# Patient Record
Sex: MALE | Race: WHITE
[De-identification: names, ages, dates, MRNs, and addresses within clinical notes are randomized per-mention and may not be internally consistent; named-entity substitution may affect disease eponyms.]

---

## 2021-04-16 ENCOUNTER — HOSPITAL ENCOUNTER (OUTPATIENT)
Dept: HOSPITAL 47 - RADMRIMAIN | Age: 51
Discharge: HOME | End: 2021-04-16
Attending: ORTHOPAEDIC SURGERY
Payer: COMMERCIAL

## 2021-04-16 DIAGNOSIS — M17.11: ICD-10-CM

## 2021-04-16 DIAGNOSIS — M23.321: Primary | ICD-10-CM

## 2021-04-18 NOTE — MR
EXAMINATION TYPE: MR knee RT wo con

 

DATE OF EXAM: 4/16/2021

 

COMPARISON: Radiograph 4/9/2021

 

HISTORY: 50-year-old male Right knee pain

 

TECHNIQUE: Multiplanar, multisequence imaging of the right knee is performed without IV contrast.

 

FINDINGS:

ACL and PCL are intact. There is mild thickening of the intact MCL fibers suggesting sequela of old i
njury. LCL complex is intact.

 

There is an oblique tear involving the junction of the posterior horn and body of the medial meniscus
. In addition, cystic change is present at the tibial attachment site of the anterior horn of the med
ial meniscus. This could represent a subtle partial tear at the anterior root. Moderate irregular car
tilage loss along the mid weightbearing aspect of the medial femoral condyle in the medial compartmen
t. Additional mild irregular cartilage loss along the medial central aspect of the medial femoral con
dyle.

 

Minimal inner margin fraying of the body of the lateral meniscus. Overall lateral compartment articul
ar cartilage appears maintained.

 

Patellofemoral compartment shows moderate irregular thinning of articular cartilage along the medial 
patellar facet and some reactive underlying subchondral signal changes especially along the inferior 
aspect of the medial patellar facet. Mild to moderate cartilage measuring along the medial trochlear 
facet but with overall preserved cartilage volume.

 

Extensor mechanism is intact with nonspecific anterior soft tissues. There is some bony irregularity 
at the tibial tuberosity probably reflecting old sprain or injury at the tendon insertion.

 

Moderate to large joint effusion. There is a 1.6 x 1.4 cm multilocular ganglion cyst in the lateral g
astrocnemius origin.

 

Normal popliteal artery anatomy and muscle bulk. Mild change within the lateral gastrocnemius. No sharon
picious bone marrow replacement.

 

 

IMPRESSION: 

 

1. Oblique tear at the junction of the posterior horn and body of the medial meniscus. Some intraosse
ous cystic change at the anterior root insertion of the medial meniscus could reflect a subtle small 
partial anterior root tear.

 

2. Mild overall medial compartmental OA.

 

3. Moderate OA along the medial facets of the patellofemoral compartment.

 

4. Moderate to large knee joint effusion. Some edema within the lateral gastrocnemius could be reacti
ve to altered biomechanics or could reflect a mild muscle strain.

## 2021-06-09 ENCOUNTER — HOSPITAL ENCOUNTER (OUTPATIENT)
Dept: HOSPITAL 47 - LABWHC1 | Age: 51
Discharge: HOME | End: 2021-06-09
Attending: ORTHOPAEDIC SURGERY
Payer: COMMERCIAL

## 2021-06-09 DIAGNOSIS — M23.91: ICD-10-CM

## 2021-06-09 DIAGNOSIS — Z01.812: Primary | ICD-10-CM

## 2021-06-09 LAB
ANION GAP SERPL CALC-SCNC: 9 MMOL/L
BASOPHILS # BLD AUTO: 0 K/UL (ref 0–0.2)
BASOPHILS NFR BLD AUTO: 1 %
CHLORIDE SERPL-SCNC: 106 MMOL/L (ref 98–107)
CO2 SERPL-SCNC: 24 MMOL/L (ref 22–30)
EOSINOPHIL # BLD AUTO: 0.3 K/UL (ref 0–0.7)
EOSINOPHIL NFR BLD AUTO: 4 %
ERYTHROCYTE [DISTWIDTH] IN BLOOD BY AUTOMATED COUNT: 4.93 M/UL (ref 4.3–5.9)
ERYTHROCYTE [DISTWIDTH] IN BLOOD: 13.1 % (ref 11.5–15.5)
HCT VFR BLD AUTO: 44.7 % (ref 39–53)
HGB BLD-MCNC: 14.5 GM/DL (ref 13–17.5)
LYMPHOCYTES # SPEC AUTO: 1.5 K/UL (ref 1–4.8)
LYMPHOCYTES NFR SPEC AUTO: 23 %
MCH RBC QN AUTO: 29.3 PG (ref 25–35)
MCHC RBC AUTO-ENTMCNC: 32.4 G/DL (ref 31–37)
MCV RBC AUTO: 90.6 FL (ref 80–100)
MONOCYTES # BLD AUTO: 0.3 K/UL (ref 0–1)
MONOCYTES NFR BLD AUTO: 5 %
NEUTROPHILS # BLD AUTO: 4.2 K/UL (ref 1.3–7.7)
NEUTROPHILS NFR BLD AUTO: 66 %
PLATELET # BLD AUTO: 259 K/UL (ref 150–450)
POTASSIUM SERPL-SCNC: 4.2 MMOL/L (ref 3.5–5.1)
SODIUM SERPL-SCNC: 139 MMOL/L (ref 137–145)
WBC # BLD AUTO: 6.4 K/UL (ref 3.8–10.6)

## 2021-06-09 PROCEDURE — 36415 COLL VENOUS BLD VENIPUNCTURE: CPT

## 2021-06-09 PROCEDURE — 80051 ELECTROLYTE PANEL: CPT

## 2021-06-09 PROCEDURE — 85025 COMPLETE CBC W/AUTO DIFF WBC: CPT

## 2021-06-09 NOTE — HP
HISTORY AND PHYSICAL



REASON FOR ADMISSION:

Surgery scheduled 0610/2021



HISTORY OF PRESENT ILLNESS:

Michael Caba is a 50-year-old gentleman seen with progressive right knee pain.  We

discussed options for treatment.  He elected to proceed with right knee arthroscopy.

Consent was obtained.



PAST MEDICAL HISTORY:

Noncontributory.



SURGICAL HISTORY:

Left knee arthroscopy.



DAILY MEDICATIONS:

None.



ALLERGIES:

NONE.



SOCIAL HISTORY:

Smokes a quarter pack of cigarettes daily.



PHYSICAL EXAMINATION:

Evaluation of the right knee is range of motion is 0-120.  He has a moderate to large

effusion.  Tenderness along the medial joint line.  Tenderness along lateral joint

line.  Positive medial Ki's.  Ligaments stable.  Hip rotation without pain.

Distal neurovascular exam intact.



RADIOGRAPHS:

Right knee radiographs revealed moderate osteoarthritic changes.



MRI right knee revealed medial meniscal tear, intra-articular effusion, osteoarthritis.



IMPRESSION:

1. Internal derangement of right knee with medial meniscal tear.

2. Right knee osteoarthritis.



PLAN:

Right knee arthroscopy with partial meniscectomy and debridement.

Surgery 06/10/2021.





MMODL / IJN: [id number] / Job#: [id number]

## 2021-06-10 ENCOUNTER — HOSPITAL ENCOUNTER (OUTPATIENT)
Dept: HOSPITAL 47 - OR | Age: 51
Discharge: HOME | End: 2021-06-10
Attending: ORTHOPAEDIC SURGERY
Payer: COMMERCIAL

## 2021-06-10 VITALS — HEART RATE: 66 BPM | DIASTOLIC BLOOD PRESSURE: 78 MMHG | SYSTOLIC BLOOD PRESSURE: 125 MMHG

## 2021-06-10 VITALS — BODY MASS INDEX: 27.6 KG/M2

## 2021-06-10 VITALS — RESPIRATION RATE: 16 BRPM

## 2021-06-10 VITALS — TEMPERATURE: 97 F

## 2021-06-10 DIAGNOSIS — M17.11: ICD-10-CM

## 2021-06-10 DIAGNOSIS — F17.210: ICD-10-CM

## 2021-06-10 DIAGNOSIS — M65.861: ICD-10-CM

## 2021-06-10 DIAGNOSIS — M23.91: ICD-10-CM

## 2021-06-10 DIAGNOSIS — M22.41: ICD-10-CM

## 2021-06-10 DIAGNOSIS — Z98.890: ICD-10-CM

## 2021-06-10 DIAGNOSIS — M23.200: Primary | ICD-10-CM

## 2021-06-10 PROCEDURE — 29881 ARTHRS KNE SRG MNISECTMY M/L: CPT

## 2021-06-10 NOTE — P.OP
Date of Procedure: 06/10/21


Preoperative Diagnosis: 


internal derangement right knee


Postoperative Diagnosis: 


1.  Tear lateral meniscus right knee


2.  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


3.  Grade 3/4 chondromalacia patellofemoral joint right knee


4.  Reactive synovitis medial, lateral and suprapatellar compartments right knee


Procedure(s) Performed: 


1.  Arthroscopic partial lateral meniscectomy right knee


2.  Arthroscopic chondroplasty medial femoral condyle right knee


3.  Arthroscopic chondroplasty patellofemoral joint right knee


4.  Arthroscopic partial synovectomy medial, lateral and suprapatellar 

compartments right knee





Anesthesia: DESTINEE, local


Surgeon: Isaac Sams


Estimated Blood Loss (ml): 5


Pathology: none sent


Condition: stable


Disposition: PACU


Indications for Procedure: 


50-year-old patient seen with progressive right knee pain.  After treatment 

options were discussed, he elected to proceed with arthroscopy.


Operative Findings: 


See description of procedure


Description of Procedure: 


Patient was taken to the operative suite.  Patient underwent a general 

anesthetic by the department of anesthesia.  Patient was given preoperative 

antibiotics.  The right lower extremity was placed in a well-padded arthroscopic

leg nagy.  The right leg was prepped and draped in the normal sterile 

orthopedic fashion.  A lateral parapatellar and suprapatellar incision was made.

 Trochars were inserted.  Arthroscopy was initiated.  Suprapatellar pouch 

revealed diffuse thick reactive synovitis.  The patellofemoral joint appeared 

radicular congruently.  There with grade 3/4 chondromalacia involving both the 

patella and femoral sulcus.  There were some areas of exposed bone on the 

femoral sulcus side.  There was some diffuse osteochondral tears present on both

sides..  The scope was guided into the medial gutter.  No loose bodies or plica 

were identified.  The scope was then guided into the medial compartment.  A 

medial parapatellar incision was made.  Trocar inserted followed by probe.  The 

medial meniscus was probed and was found to be stable.  There were grade 2/3 

chondromalacia changes involving the medial femoral condyle with some 

osteochondral tears present.  There was some reactive synovitis anteriorly.  A 

motorize shaver was introduced and a chondroplasty of the medial down to stable 

osteochondral tissue.  I performed a partial synovectomy was reactive synovitis.

 Shaver was removed.  There was some wearing out of the anterior horn medial 

meniscus now visualized after the synovectomy.  It was stable.  The residual 

osteochondral surface was stable.  There was good decompression of the synovi

tis.  Scope and probe were then guided into the intercondylar notch.  Cruciates 

were identified, probed and found to be stable.  The scope and probe were then 

guided into lateral compartment.  There was a small radial tear mid body lateral

meniscus.  There was no significant chondromalacia involving the lateral 

compartment.  There was synovitis anteriorly.  Various hand basket instruments 

were introduced and I performed a partial lateral meniscectomy getting down to 

stable meniscal tissue.  Motorize shaver was introduced in the area up the 

meniscal fragments followed by performing a partial synovectomy.  Shaver was 

removed.  The residual meniscus was stable.  There was good decompression of 

synovitis anteriorly.  The scope was in guided back into the suprapatellar 

compartment.  I introduced a motorized shaver into the suprapatellar 

compartment.  I debrided some piecemeal fragments of meniscus I encountered.  I 

performed a chondroplasty of the patellofemoral joint getting down to stable 

osteochondral tissue.  I performed a partial synovectomy decompressing reactive 

synovitis.  The shaver was removed.  There was good decompression of synovitis. 

The residual osteochondral surfaces of the patellofemoral joint appeared stable 

again noting some exposed bone on the femoral sulcus side.  Instruments were now

removed from the joint.  The joint was infiltrated with .25% Marcaine.  Steri-

Strips were applied to the portal sites.  Sterile dressings were applied.  The 

patient was placed into a ISAIAH hose.  No tourniquet was utilized.  The patient 

was awakened, transferred to a bed and taken to recovery stable satisfactory 

condition.

## 2021-11-22 ENCOUNTER — HOSPITAL ENCOUNTER (OUTPATIENT)
Dept: HOSPITAL 47 - RADMRIMAIN | Age: 51
Discharge: HOME | End: 2021-11-22
Attending: ORTHOPAEDIC SURGERY
Payer: COMMERCIAL
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
## 2021-11-22 DIAGNOSIS — S83.282A: ICD-10-CM

## 2021-11-22 DIAGNOSIS — S83.412A: Primary | ICD-10-CM

## 2021-11-22 DIAGNOSIS — S83.242A: ICD-10-CM

## 2021-11-22 DIAGNOSIS — M17.12: ICD-10-CM

## 2021-11-22 DIAGNOSIS — M25.462: ICD-10-CM

## 2021-11-22 NOTE — MR
EXAMINATION TYPE: MR knee LT wo con

 

DATE OF EXAM: 11/22/2021

 

COMPARISON: Bilateral knee x-rays November 2, 2021

 

HISTORY: Left knee pain, painful kneecap, and swelling for 3 and a half months.

 

TECHNIQUE: Multiplanar, multisequence imaging of the left knee is performed without IV contrast.

 

FINDINGS:

 

MEDIAL MENISCUS: Medial extrusion medial meniscus on coronal images. Some abnormal signal posterior a
spect posterior horn extends to articular surface inferiorly sagittal image 28

 

LATERAL MENISCUS: Globular increased signal posterior horn lateral meniscus extends to superior artic
ular surface coronal image 28 where there is vertical component on corresponding sagittal image 7.

 

CRUCIATE LIGAMENTS: The anterior and posterior cruciate ligaments are intact and unremarkable.

 

COLLATERAL LIGAMENTS: The lateral collateral ligament complex is intact and unremarkable. Fluid surro
unds medial collateral ligament particularly proximal fibers which has increased signal and thickenin
g, in addition there is partial tearing along the posterior proximal attachment sagittal image 30 for
 reference. There is tiny ossific fragment or density in the distal attachment coronal image 24.

 

EXTENSOR MECHANISM: Visualized quadriceps and patellar tendons are intact. Prominent spur from the in
ferior posterior patella at proximal patellar tendon attachment where there is mild increased signal 
noted.

 

EFFUSION:  Moderate size suprapatellar joint effusion.

 

POPLITEAL CYST:  No popliteal/baker cyst.

 

TRICOMPARTMENT SPACES: Moderate to severe narrowing medial tibiofemoral compartment mild spurring. Mi
ld spurring and narrowing the lateral tibiofemoral compartment. Mild narrowing and mild-to-moderate s
purring patellofemoral compartment

 

CARTILAGE: Most prominent Cartilaginous loss medial tibiofemoral compartment with areas of full-thick
ness loss seen.

 

BONE MARROW SIGNAL: Areas of low T1 and increased T2 signal medial aspect medial tibial plateau and t
he distal medial femoral condyle are present. OTHER:  No additional significant abnormality is apprec
iated.

 

IMPRESSION: 

1. Moderate to severe medial tibiofemoral compartment degenerative changes may be on the basis of adv
anced osteoarthritis as detailed above.

2. Full-thickness tear posterior horn medial meniscus.

3. Partial tear of the MCL with sprain injury.

4. Full-thickness tear posterior horn lateral meniscus.

5. Moderate-sized suprapatellar joint effusion.

## 2021-12-17 ENCOUNTER — HOSPITAL ENCOUNTER (OUTPATIENT)
Dept: HOSPITAL 47 - LABPAT | Age: 51
Discharge: HOME | End: 2021-12-17
Attending: ORTHOPAEDIC SURGERY
Payer: COMMERCIAL

## 2021-12-17 DIAGNOSIS — Z01.812: Primary | ICD-10-CM

## 2021-12-17 DIAGNOSIS — M23.92: ICD-10-CM

## 2021-12-17 LAB
ANION GAP SERPL CALC-SCNC: 10 MMOL/L
BASOPHILS # BLD AUTO: 0 K/UL (ref 0–0.2)
BASOPHILS NFR BLD AUTO: 0 %
CHLORIDE SERPL-SCNC: 102 MMOL/L (ref 98–107)
CO2 SERPL-SCNC: 28 MMOL/L (ref 22–30)
EOSINOPHIL # BLD AUTO: 0.2 K/UL (ref 0–0.7)
EOSINOPHIL NFR BLD AUTO: 4 %
ERYTHROCYTE [DISTWIDTH] IN BLOOD BY AUTOMATED COUNT: 4.9 M/UL (ref 4.3–5.9)
ERYTHROCYTE [DISTWIDTH] IN BLOOD: 13 % (ref 11.5–15.5)
HCT VFR BLD AUTO: 44.7 % (ref 39–53)
HGB BLD-MCNC: 14.8 GM/DL (ref 13–17.5)
LYMPHOCYTES # SPEC AUTO: 1.6 K/UL (ref 1–4.8)
LYMPHOCYTES NFR SPEC AUTO: 28 %
MCH RBC QN AUTO: 30.1 PG (ref 25–35)
MCHC RBC AUTO-ENTMCNC: 33.1 G/DL (ref 31–37)
MCV RBC AUTO: 91.1 FL (ref 80–100)
MONOCYTES # BLD AUTO: 0.3 K/UL (ref 0–1)
MONOCYTES NFR BLD AUTO: 6 %
NEUTROPHILS # BLD AUTO: 3.3 K/UL (ref 1.3–7.7)
NEUTROPHILS NFR BLD AUTO: 60 %
PLATELET # BLD AUTO: 308 K/UL (ref 150–450)
POTASSIUM SERPL-SCNC: 5.1 MMOL/L (ref 3.5–5.1)
SODIUM SERPL-SCNC: 140 MMOL/L (ref 137–145)
WBC # BLD AUTO: 5.6 K/UL (ref 3.8–10.6)

## 2021-12-17 PROCEDURE — 36415 COLL VENOUS BLD VENIPUNCTURE: CPT

## 2021-12-17 PROCEDURE — 80051 ELECTROLYTE PANEL: CPT

## 2021-12-17 PROCEDURE — 85025 COMPLETE CBC W/AUTO DIFF WBC: CPT

## 2021-12-22 NOTE — HP
HISTORY AND PHYSICAL



DATE OF SURGERY:

12/23/2021



Michael Caba is a 51-year-old patient seen with progressive left knee pain.  We

discussed options for treatment.  He elected to proceed with left knee arthroscopy.

Consent was obtained.



PAST MEDICAL HISTORY:

Noncontributory.



PAST SURGICAL HISTORY:

Left knee arthroscopy.



DAILY MEDICATIONS:

Aleve.



ALLERGIES:

NONE.



SOCIAL HISTORY:

He smokes one quarter pack of cigarettes daily.



PHYSICAL EVALUATION OF THE LEFT KNEE:

His range of motion is zero to 130.  Mild effusion.  Tenderness along the medial joint

line. Tenderness along the lateral joint line.  Positive medial Ki's.  Positive

lateral Ki's.  Ligaments appear stable.  Distal neurovascular exam is intact.



Radiographs of the left knee revealed osteoarthritic changes.  MRI left knee revealed

medial and lateral meniscal tears.



IMPRESSION:

1. Internal derangement of left knee with medial and lateral meniscal tears.

2. Left knee osteoarthritis.



PLAN:

Left knee arthroscopy with partial meniscectomy and debridement.





MMODL / IJN: [id number] / Job#: [id number]

## 2021-12-23 ENCOUNTER — HOSPITAL ENCOUNTER (OUTPATIENT)
Dept: HOSPITAL 47 - OR | Age: 51
Discharge: HOME | End: 2021-12-23
Attending: ORTHOPAEDIC SURGERY
Payer: COMMERCIAL

## 2021-12-23 VITALS — SYSTOLIC BLOOD PRESSURE: 114 MMHG | DIASTOLIC BLOOD PRESSURE: 78 MMHG | HEART RATE: 56 BPM

## 2021-12-23 VITALS — BODY MASS INDEX: 28.2 KG/M2

## 2021-12-23 VITALS — TEMPERATURE: 97.7 F

## 2021-12-23 VITALS — RESPIRATION RATE: 16 BRPM

## 2021-12-23 DIAGNOSIS — S83.242A: Primary | ICD-10-CM

## 2021-12-23 DIAGNOSIS — M94.262: ICD-10-CM

## 2021-12-23 PROCEDURE — 29881 ARTHRS KNE SRG MNISECTMY M/L: CPT

## 2021-12-23 PROCEDURE — 29876 ARTHRS KNEE SURG SYNVCT MAJ: CPT

## 2021-12-23 NOTE — P.OP
Date of Procedure: 12/23/21


Preoperative Diagnosis: 


Internal derangement left knee


Postoperative Diagnosis: 


1.  Tear medial meniscus left knee


2.  Grade 2 chondromalacia medial femoral condyle left knee


3.  Grade 4 chondromalacia medial tibial plateau left knee


4.  Reactive synovitis medial, lateral and suprapatellar compartments left knee


Procedure(s) Performed: 


1.  Arthroscopic partial medial meniscectomy left knee


2.  Arthroscopic chondroplasty medial femoral condyle left knee


3.  Arthroscopic partial synovectomy medial, lateral and suprapatellar 

compartments left knee





Anesthesia: GETA, local


Surgeon: Isaac Sams


Estimated Blood Loss (ml): 6


Pathology: none sent


Condition: stable


Disposition: PACU


Indications for Procedure: 


51-year-old patient seen with progressive left knee pain.  After treatment 

options were discussed, he elected to proceed with arthroscopy.


Operative Findings: 


See description of procedure


Description of Procedure: 


Patient was taken to the operative suite.  Patient underwent a general 

anesthetic by the department of anesthesia.  Patient was given preoperative 

antibiotics.  The left lower extremity was placed in a well-padded arthroscopic 

leg nagy.  The left leg was prepped and draped in the normal sterile 

orthopedic fashion.  A lateral parapatellar and suprapatellar incision was made.

 Trochars were inserted.  Arthroscopy was initiated.  Suprapatellar pouch 

revealed diffuse thick reactive synovitis.  The patellofemoral joint appeared to

articulate congruently.  There was grade 1/2 chondromalacia with no tears 

present.  The scope was guided into the medial gutter.  No loose bodies or plica

were identified.  The scope was then guided into the medial compartment.  A 

medial parapatellar incision was made.  Trocar inserted followed by probe.  

There was a complex tear involving the posterior horn of the medial meniscus.  

There were grade 2 chondromalacia changes of the medial femoral condyle with 

some osteochondral flap tears present.  There was an area of grade 4 

chondromalacia medial tibial plateau with exposed bone.  There was thick 

reactive synovitis anteriorly.  I performed a partial medial meniscectomy 

getting down to stable meniscal tissue.  I performed a chondroplasty of the 

medial femoral condyle getting down to stable osteochondral tissue.  I performed

a partial synovectomy decompressing the thick reactive synovitis anteriorly.  

The shaver was removed.  The residual meniscus was stable.  The residual 

osteochondral surface was stable.  There was good decompression of the 

synovitis.   Scope and probe were then guided into the intercondylar notch.  

Cruciates were identified, probed and found to be stable.  The scope and probe 

were then guided into lateral compartment.  Lateral meniscus was probed and was 

found to be stable.  There was minimal grade 1 chondromalacia lateral 

compartment.  There was some thick reactive synovitis anteriorly.  I introduced 

a motorized shaver and I performed a partial synovectomy.  The shaver was 

removed.  There was good decompression of synovitis.  The scope was in guided 

back into the suprapatellar compartment.  I introduced a motorized shaver into 

the suprapatellar compartment.  I debrided some piecemeal fragments of meniscus 

I encountered.  I performed a partial synovectomy.  The shaver was removed.  

There was good decompression of synovitis.  I took one more look around the 

entire knee, no residual debris.  Instruments were now removed from the joint.  

The joint was infiltrated with .25% Marcaine.  Steri-Strips were applied to the 

portal sites.  Sterile dressings were applied.  The patient was placed into a 

ISAIAH hose.  No tourniquet was utilized.  The patient was awakened, transferred to

a bed and taken to recovery stable satisfactory condition.

## 2023-05-09 ENCOUNTER — HOSPITAL ENCOUNTER (OUTPATIENT)
Dept: HOSPITAL 47 - ORWHC2ENDO | Age: 53
Discharge: HOME | End: 2023-05-09
Attending: INTERNAL MEDICINE
Payer: COMMERCIAL

## 2023-05-09 VITALS — HEART RATE: 63 BPM | DIASTOLIC BLOOD PRESSURE: 77 MMHG | SYSTOLIC BLOOD PRESSURE: 112 MMHG

## 2023-05-09 VITALS — RESPIRATION RATE: 16 BRPM | TEMPERATURE: 97.9 F

## 2023-05-09 VITALS — BODY MASS INDEX: 28.2 KG/M2

## 2023-05-09 DIAGNOSIS — Z12.11: Primary | ICD-10-CM

## 2023-05-09 DIAGNOSIS — K64.1: ICD-10-CM

## 2023-05-09 DIAGNOSIS — K63.5: ICD-10-CM

## 2023-05-09 DIAGNOSIS — Z80.0: ICD-10-CM

## 2023-05-09 DIAGNOSIS — Z79.899: ICD-10-CM

## 2023-05-09 DIAGNOSIS — F17.200: ICD-10-CM

## 2023-05-09 PROCEDURE — 45380 COLONOSCOPY AND BIOPSY: CPT

## 2023-05-09 PROCEDURE — 88305 TISSUE EXAM BY PATHOLOGIST: CPT

## 2023-05-09 NOTE — P.PCN
Date of Procedure: 05/09/23


Procedure(s) Performed: 


BRIEF HISTORY: Patient is a 52-year-old pleasant white male scheduled for an 

elective colonoscopy as a part of screening for colon cancer and family history 

of colon cancer.  His dad was diagnosed with colon cancer at age 55, 2 paternal 

uncles in th and 1 paternal cousin in his 50s





PROCEDURE PERFORMED: Colonoscopy with biopsy. 





PREOPERATIVE DIAGNOSIS: In for colon cancer and strong family history of colon 

cancer.. 





IV sedation per Anesthesia. 





PROCEDURE: After informed consent was obtained, the patient, was brought into 

the endoscopy unit. IV sedation was administered by Anesthesia under continuous 

monitoring.  Digital rectal examination was normal. Initially the Olympus CF-160

flexible video colonoscope was then inserted in the rectum, gradually advanced 

into the cecum without any difficulty. Careful examination was performed as the 

scope was gradually being withdrawn. Ileocecal valve and the appendiceal orifice

were visualized and appeared normal.  Prep was excellent. Mucosa of the cecum, 

ascending colon, transverse colon, descending colon appeared normal.  The 

sigmoid: There was a 3 mm polyp that was removed by cold biopsy.  Rest of the, 

sigmoid colon, and rectum appeared normal. Retroflexion was performed in the 

rectum and grade 2 internal hemorrhoids were seen. The patient tolerated the 

procedure well. 





IMPRESSION: 


3 mm small sigmoid polyp status post cold biopsy


Grade 2 internal hemorrhoids





RECOMMENDATIONS:  Findings of this examination were discussed with the patient 

as well as his family.  He was advised to follow with the biopsy results and 

have a repeat colonoscopy in 3 years because of the strong family history of 

colon cancer.